# Patient Record
(demographics unavailable — no encounter records)

---

## 2025-01-13 NOTE — PROCEDURE
[Transabdominal OB Sonogram] : Transabdominal OB Sonogram [Intrauterine Pregnancy] : intrauterine pregnancy [Fetal Heart] : fetal heart present [Date: ___] : Date: [unfilled] [Current GA by Sonogram: ___ (wks)] : Current GA by Sonogram: [unfilled]Uwks [___ day(s)] : [unfilled] days [Transabdominal OB Sonogram WNL] : Transabdominal OB Sonogram WNL [FreeTextEntry1] : Viable SIUP @ 12.5 weeks giving an AVELINA of 07/23/25. By LMP - Now @ 13.3 weeks giving a AVELINA of 07/18/25

## 2025-01-13 NOTE — HISTORY OF PRESENT ILLNESS
[FreeTextEntry1] : JOSEPH PEDROZA 22 YO, presents for GYN Exam & delayed menses G 1 LMP: 10/11/24 - Regular menses UCG Positive @ Home on 12/01/24 Medication: PNV, Folic Acid Pap - WNL on 12/04/24 @ [facility] No family history of breast cancer. To do monthly SBE.  No health issues, nonsmoker, NKDA.  For dating sonogram today.  HIV counseling done & blood drawn for PNP & NIPS.  For NT @ [facility] by 01/17.

## 2025-07-02 NOTE — HISTORY OF PRESENT ILLNESS
[FreeTextEntry1] : JOSEPH SANCHEZ 23 YO presents for  Blood pressure check  G1  P 1001-  on 25 at [facility] - GIRL Was send to hospital for Elevated PC ratio & labile BP. Developed severe features & labor was induced. Breast feeding       Medication: PNV  BP @ home 120-130/ 80's.  Normal lochia, no fever, no complaints.